# Patient Record
(demographics unavailable — no encounter records)

---

## 2025-05-19 NOTE — HISTORY OF PRESENT ILLNESS
[FreeTextEntry1] : Rash  [de-identified] : 21 y/o female present with concern of rash here with her mom who is my patient  Location: bilateral underarms Duration: since Oct 2024 Treatment: otc hydrocortisone cream and changed deodorant  Symptoms: itch and redness   was using secret deodorant and now switched to dove alum free deodorant (unscented) shaves every other day, does not use shaving gel

## 2025-05-19 NOTE — ASSESSMENT
[FreeTextEntry1] : #Atopic dermatitis - L upper eyelid #Irritant dermatitis - bl axillae -chronic, flaring -I have discussed the chronic nature and course of this condition -start hydrocortisone 2.5% cream bid to affected areas, sed -switch to vanicream deodorant -aveeno shaving gel  RTC PRN

## 2025-05-19 NOTE — PHYSICAL EXAM
[Alert] : alert [Oriented x 3] : ~L oriented x 3 [FreeTextEntry3] : Focused exam: -pink patches bl axillae small pink patch L upper eyelid